# Patient Record
Sex: FEMALE | Race: WHITE | NOT HISPANIC OR LATINO | Employment: OTHER | URBAN - METROPOLITAN AREA
[De-identification: names, ages, dates, MRNs, and addresses within clinical notes are randomized per-mention and may not be internally consistent; named-entity substitution may affect disease eponyms.]

---

## 2019-04-29 ENCOUNTER — APPOINTMENT (OUTPATIENT)
Dept: RADIOLOGY | Facility: CLINIC | Age: 74
End: 2019-04-29
Payer: MEDICARE

## 2019-04-29 ENCOUNTER — OFFICE VISIT (OUTPATIENT)
Dept: URGENT CARE | Facility: CLINIC | Age: 74
End: 2019-04-29
Payer: MEDICARE

## 2019-04-29 VITALS
SYSTOLIC BLOOD PRESSURE: 130 MMHG | RESPIRATION RATE: 18 BRPM | DIASTOLIC BLOOD PRESSURE: 73 MMHG | HEART RATE: 79 BPM | OXYGEN SATURATION: 96 % | WEIGHT: 230 LBS | HEIGHT: 67 IN | BODY MASS INDEX: 36.1 KG/M2 | TEMPERATURE: 97.1 F

## 2019-04-29 DIAGNOSIS — M79.671 RIGHT FOOT PAIN: ICD-10-CM

## 2019-04-29 DIAGNOSIS — M79.671 RIGHT FOOT PAIN: Primary | ICD-10-CM

## 2019-04-29 PROCEDURE — 99213 OFFICE O/P EST LOW 20 MIN: CPT | Performed by: NURSE PRACTITIONER

## 2019-04-29 PROCEDURE — 73630 X-RAY EXAM OF FOOT: CPT

## 2019-04-29 RX ORDER — MIRABEGRON 50 MG/1
TABLET, FILM COATED, EXTENDED RELEASE ORAL
Refills: 0 | COMMUNITY
Start: 2019-03-18

## 2019-04-29 RX ORDER — LEVOTHYROXINE SODIUM 0.12 MG/1
125 TABLET ORAL DAILY
Refills: 0 | COMMUNITY
Start: 2019-03-18

## 2019-04-29 RX ORDER — LISINOPRIL AND HYDROCHLOROTHIAZIDE 20; 12.5 MG/1; MG/1
1 TABLET ORAL DAILY
Refills: 0 | COMMUNITY
Start: 2019-03-18

## 2019-04-29 RX ORDER — TRAMADOL HYDROCHLORIDE 50 MG/1
TABLET ORAL
Refills: 0 | COMMUNITY
Start: 2019-03-17

## 2019-04-29 RX ORDER — GABAPENTIN 300 MG/1
CAPSULE ORAL
Refills: 0 | COMMUNITY
Start: 2019-04-17

## 2019-04-29 RX ORDER — LORAZEPAM 0.5 MG/1
TABLET ORAL
Refills: 0 | COMMUNITY
Start: 2019-04-17

## 2019-04-29 RX ORDER — ATORVASTATIN CALCIUM 40 MG/1
TABLET, FILM COATED ORAL
Refills: 0 | COMMUNITY
Start: 2019-03-09

## 2019-04-29 RX ORDER — ESCITALOPRAM OXALATE 10 MG/1
10 TABLET ORAL DAILY
Refills: 0 | COMMUNITY
Start: 2019-04-15

## 2019-04-29 RX ORDER — HUMAN INSULIN 100 [USP'U]/ML
150 INJECTION, SUSPENSION SUBCUTANEOUS
Refills: 0 | COMMUNITY
Start: 2019-03-11

## 2019-04-29 RX ORDER — ESCITALOPRAM OXALATE 20 MG/1
20 TABLET ORAL DAILY
Refills: 0 | COMMUNITY
Start: 2019-04-08

## 2019-04-29 RX ORDER — PREDNISONE 20 MG/1
20 TABLET ORAL 2 TIMES DAILY WITH MEALS
Qty: 10 TABLET | Refills: 0 | Status: SHIPPED | OUTPATIENT
Start: 2019-04-29 | End: 2019-05-04

## 2019-04-29 RX ORDER — BUSPIRONE HYDROCHLORIDE 10 MG/1
TABLET ORAL
Refills: 0 | COMMUNITY
Start: 2019-03-22

## 2019-05-02 RX ORDER — PEN NEEDLE, DIABETIC 29 G X1/2"
NEEDLE, DISPOSABLE MISCELLANEOUS
Refills: 0 | COMMUNITY
Start: 2019-03-09

## 2019-05-02 RX ORDER — METOPROLOL SUCCINATE 25 MG/1
TABLET, EXTENDED RELEASE ORAL
COMMUNITY
Start: 2017-12-12

## 2019-05-02 RX ORDER — LANSOPRAZOLE 30 MG/1
30 CAPSULE, DELAYED RELEASE ORAL DAILY
COMMUNITY
Start: 2018-11-27

## 2019-05-07 ENCOUNTER — OFFICE VISIT (OUTPATIENT)
Dept: FAMILY MEDICINE CLINIC | Facility: CLINIC | Age: 74
End: 2019-05-07
Payer: MEDICARE

## 2019-05-07 VITALS
SYSTOLIC BLOOD PRESSURE: 140 MMHG | TEMPERATURE: 98 F | DIASTOLIC BLOOD PRESSURE: 60 MMHG | HEART RATE: 76 BPM | OXYGEN SATURATION: 96 % | RESPIRATION RATE: 20 BRPM | HEIGHT: 65 IN | WEIGHT: 231 LBS | BODY MASS INDEX: 38.49 KG/M2

## 2019-05-07 DIAGNOSIS — M10.9 ACUTE GOUT INVOLVING TOE OF RIGHT FOOT, UNSPECIFIED CAUSE: Primary | ICD-10-CM

## 2019-05-07 PROBLEM — K21.9 GASTROESOPHAGEAL REFLUX DISEASE WITHOUT ESOPHAGITIS: Status: ACTIVE | Noted: 2019-05-07

## 2019-05-07 PROBLEM — J44.9 COPD (CHRONIC OBSTRUCTIVE PULMONARY DISEASE) (HCC): Status: ACTIVE | Noted: 2019-05-07

## 2019-05-07 PROBLEM — Z79.4 TYPE 2 DIABETES MELLITUS WITHOUT COMPLICATION, WITH LONG-TERM CURRENT USE OF INSULIN (HCC): Status: ACTIVE | Noted: 2019-05-07

## 2019-05-07 PROBLEM — I10 ESSENTIAL HYPERTENSION: Status: ACTIVE | Noted: 2019-05-07

## 2019-05-07 PROBLEM — E78.2 MIXED HYPERLIPIDEMIA: Status: ACTIVE | Noted: 2019-05-07

## 2019-05-07 PROBLEM — F41.9 ANXIETY: Status: ACTIVE | Noted: 2019-05-07

## 2019-05-07 PROBLEM — E03.9 ACQUIRED HYPOTHYROIDISM: Status: ACTIVE | Noted: 2019-05-07

## 2019-05-07 PROBLEM — E11.9 TYPE 2 DIABETES MELLITUS WITHOUT COMPLICATION, WITH LONG-TERM CURRENT USE OF INSULIN (HCC): Status: ACTIVE | Noted: 2019-05-07

## 2019-05-07 PROCEDURE — 99203 OFFICE O/P NEW LOW 30 MIN: CPT | Performed by: NURSE PRACTITIONER

## 2020-01-06 ENCOUNTER — TELEPHONE (OUTPATIENT)
Dept: FAMILY MEDICINE CLINIC | Facility: CLINIC | Age: 75
End: 2020-01-06

## 2020-02-03 NOTE — TELEPHONE ENCOUNTER
02/03/20 2:52 PM     Thank you for your request  Your request has been received and reviewed  This message will now be completed  For future reference, please do not remove PCP at office level for this scenario; [facility] department will remove PCP as part of their workflow       Thank you  Ericka Aj

## 2021-02-25 ENCOUNTER — IMMUNIZATIONS (OUTPATIENT)
Dept: FAMILY MEDICINE CLINIC | Facility: HOSPITAL | Age: 76
End: 2021-02-25

## 2021-02-25 DIAGNOSIS — Z23 ENCOUNTER FOR IMMUNIZATION: Primary | ICD-10-CM

## 2021-02-25 PROCEDURE — 0011A SARS-COV-2 / COVID-19 MRNA VACCINE (MODERNA) 100 MCG: CPT

## 2021-02-25 PROCEDURE — 91301 SARS-COV-2 / COVID-19 MRNA VACCINE (MODERNA) 100 MCG: CPT

## 2021-03-25 ENCOUNTER — IMMUNIZATIONS (OUTPATIENT)
Dept: FAMILY MEDICINE CLINIC | Facility: HOSPITAL | Age: 76
End: 2021-03-25

## 2021-03-25 DIAGNOSIS — Z23 ENCOUNTER FOR IMMUNIZATION: Primary | ICD-10-CM

## 2021-03-25 PROCEDURE — 91301 SARS-COV-2 / COVID-19 MRNA VACCINE (MODERNA) 100 MCG: CPT

## 2021-03-25 PROCEDURE — 0012A SARS-COV-2 / COVID-19 MRNA VACCINE (MODERNA) 100 MCG: CPT

## 2022-01-05 ENCOUNTER — TELEPHONE (OUTPATIENT)
Dept: SPEECH THERAPY | Facility: CLINIC | Age: 77
End: 2022-01-05

## 2022-01-10 ENCOUNTER — CONSULT (OUTPATIENT)
Dept: PULMONOLOGY | Facility: MEDICAL CENTER | Age: 77
End: 2022-01-10
Payer: MEDICARE

## 2022-01-10 VITALS
HEART RATE: 95 BPM | OXYGEN SATURATION: 98 % | HEIGHT: 66 IN | DIASTOLIC BLOOD PRESSURE: 82 MMHG | RESPIRATION RATE: 12 BRPM | TEMPERATURE: 91 F | BODY MASS INDEX: 32.62 KG/M2 | WEIGHT: 203 LBS | SYSTOLIC BLOOD PRESSURE: 122 MMHG

## 2022-01-10 DIAGNOSIS — I10 ESSENTIAL HYPERTENSION: ICD-10-CM

## 2022-01-10 DIAGNOSIS — R91.1 LUNG NODULE: ICD-10-CM

## 2022-01-10 DIAGNOSIS — J43.2 CENTRILOBULAR EMPHYSEMA (HCC): Primary | ICD-10-CM

## 2022-01-10 PROCEDURE — 99204 OFFICE O/P NEW MOD 45 MIN: CPT | Performed by: INTERNAL MEDICINE

## 2022-01-10 RX ORDER — ALBUTEROL SULFATE 90 UG/1
2 AEROSOL, METERED RESPIRATORY (INHALATION) EVERY 6 HOURS PRN
Qty: 18 G | Refills: 3 | Status: SHIPPED | OUTPATIENT
Start: 2022-01-10

## 2022-01-10 RX ORDER — BUDESONIDE 0.5 MG/2ML
0.5 INHALANT ORAL 2 TIMES DAILY
COMMUNITY
Start: 2021-07-15

## 2022-01-10 RX ORDER — IPRATROPIUM BROMIDE AND ALBUTEROL SULFATE 2.5; .5 MG/3ML; MG/3ML
3 SOLUTION RESPIRATORY (INHALATION)
COMMUNITY
Start: 2021-07-27

## 2022-01-10 NOTE — ASSESSMENT & PLAN NOTE
PET/Ct scan in 8/7/2021 shows 1 cm RUL nodule with no size increase compared to May since 2021 and very little if any change comparing with August 2021   Minimal FGD uptake     - follow up with CT chest wo contrast

## 2022-01-10 NOTE — ASSESSMENT & PLAN NOTE
COPD/ emphyzema  Patient gets SOB with walking 1/4 of a block  Patient is a former smoker for 20 years less then 1 pack per day  She used to work in the [employer] department for 25 years    Patient had PFT done in the past but result is not available  Patient has no allergies to dust or polline    Pulmoicort and DuoNebs via nebulizer twice a day    - continue nebulizers budesonide and duonebs bid  - albuterol prn  - follow PFT

## 2022-01-10 NOTE — PROGRESS NOTES
Assessment/Plan:    Type 2 diabetes mellitus without complication, with long-term current use of insulin ([facility] )  Patient is on metformin, Novolin     No results found for: HGBA1C    Acquired hypothyroidism  Patient takes levothyroxine  Follows with family medicine doctor    Anxiety  Patient takes ativan, buspiron and escitalopram  Patient reports was hospitalized in June for anxiety and SOB    COPD (chronic obstructive pulmonary disease) ([facility] )  COPD/ emphyzema  Patient gets SOB with walking 1/4 of a block  Patient is a former smoker for 20 years less then 1 pack per day  She used to work in the police department for 25 years  Patient had PFT done in the past but result is not available  Patient has no allergies to dust or polline    Pulmoicort and DuoNebs via nebulizer twice a day    - continue nebulizers  bid  - albuterol prn      Pulmonary nodule  PET/Ct scan in 8/7/2021 shows 1 cm RUL nodule with no size increase compared to May since 2021 and very little if any change comparing with August 2021  Minimal FGD uptake     - follow up with CT chest wo contrast       Diagnoses and all orders for this visit:    Centrilobular emphysema (HCC)  -     albuterol (Ventolin HFA) 90 mcg/act inhaler; Inhale 2 puffs every 6 (six) hours as needed for wheezing    Essential hypertension    Lung nodule  -     CT chest without contrast; Future    Other orders  -     budesonide (PULMICORT) 0 5 mg/2 mL nebulizer solution; Inhale 0 5 mg 2 (two) times a day  -     ipratropium-albuterol (DUO-NEB) 0 5-2 5 mg/3 mL nebulizer solution; Inhale 3 mL          Subjective:      Patient ID: Kya Bell is a 67 y o  female  HPI  Patient comes to establish care with pulmonologist  She used to see Dr Nimo Jeronimo but now he is retired  Patient is complaining of sputum production and thick mucous that sits in throat  Patient relates this to using nebulizer that she started 2 years ago   Patient has an appointment with speech pathologist   Patient reports significant SOB related to exertion  Sh eonly can walk 1/4 of a block and then needs to stop and catch her breath  Patient denies cough, chest pain, wheezing, leg swelling or palpitations  She  denies hx of heart issues  Patient had PFT done in the past but records are not available  Patient is compliant with nebulizer ipreatropium and budesonide that seem to work  Patient feels she needs rescue inhaler  Patient also complains of weakness in her right arm and pain in her right shoulder restricting full ROM  Patient was seen by neurologist for that  Patient PCP is Dr Rajwinder Madera  Patient is vaccinated for COVID  Review of Systems   Constitutional: Negative  Negative for chills, fatigue and fever  HENT: Positive for congestion, postnasal drip and voice change  Negative for hearing loss, rhinorrhea, sore throat, tinnitus and trouble swallowing  Feeling of mucous intehthroat   Respiratory: Positive for shortness of breath  Negative for cough, chest tightness and wheezing  Cardiovascular: Negative for chest pain, palpitations and leg swelling  Gastrointestinal: Negative for blood in stool and constipation  Skin: Negative for pallor and rash  Allergic/Immunologic: Negative  Neurological: Negative for weakness and headaches  Hematological: Negative  Psychiatric/Behavioral: Negative for suicidal ideas  The patient is not nervous/anxious  Objective:      /82   Pulse 95   Temp (!) 91 °F (32 8 °C) (Temporal)   Resp 12   Ht 5' 6" (1 676 m)   Wt 92 1 kg (203 lb)   SpO2 98%   BMI 32 77 kg/m²          Physical Exam  Vitals reviewed  Constitutional:       General: She is not in acute distress  Appearance: Normal appearance  She is obese  She is ill-appearing  She is not toxic-appearing  HENT:      Head: Normocephalic and atraumatic  Nose: Nose normal       Mouth/Throat:      Mouth: Mucous membranes are moist    Eyes:      General: No scleral icterus       Extraocular Movements: Extraocular movements intact  Conjunctiva/sclera: Conjunctivae normal    Cardiovascular:      Rate and Rhythm: Normal rate and regular rhythm  Pulses: Normal pulses  Heart sounds: Normal heart sounds  No murmur heard  No gallop  Pulmonary:      Effort: Pulmonary effort is normal  No respiratory distress  Breath sounds: Normal breath sounds  No wheezing, rhonchi or rales  Abdominal:      General: Bowel sounds are normal  There is no distension  Tenderness: There is no abdominal tenderness  There is no guarding or rebound  Musculoskeletal:         General: No swelling or tenderness  Skin:     General: Skin is warm  Coloration: Skin is not jaundiced  Findings: No erythema or rash  Neurological:      General: No focal deficit present  Mental Status: She is alert and oriented to person, place, and time  Cranial Nerves: No cranial nerve deficit  Motor: No weakness     Psychiatric:         Mood and Affect: Mood normal          Behavior: Behavior normal

## 2022-01-10 NOTE — ASSESSMENT & PLAN NOTE
Patient takes ativan, buspiron and escitalopram  Patient reports was hospitalized in June for anxiety and SOB

## 2022-01-19 ENCOUNTER — EVALUATION (OUTPATIENT)
Dept: SPEECH THERAPY | Facility: CLINIC | Age: 77
End: 2022-01-19
Payer: MEDICARE

## 2022-01-19 DIAGNOSIS — R48.8 OTHER SYMBOLIC DYSFUNCTIONS: ICD-10-CM

## 2022-01-19 DIAGNOSIS — R47.1 DYSARTHRIA: Primary | ICD-10-CM

## 2022-01-19 PROCEDURE — 96125 COGNITIVE TEST BY HC PRO: CPT | Performed by: SPEECH-LANGUAGE PATHOLOGIST

## 2022-01-19 PROCEDURE — 92522 EVALUATE SPEECH PRODUCTION: CPT | Performed by: SPEECH-LANGUAGE PATHOLOGIST

## 2022-01-19 NOTE — PROGRESS NOTES
Speech Language Pathology Evaluation  Today's date: 2022  Patient name: Armani Pablo    : 1945        Referring provider: Yovani Cortez MD  Dx:   Encounter Diagnosis     ICD-10-CM    1  Dysarthria  R47 1    2  Other symbolic dysfunctions  X12 4                   Subjective Comments: The patient is a 67year old female seen for a speech and cognitive linguistic exam  Etiology is unclear but the patient notes that she experienced right sided weakness approximately 3 months ago  Speech intelligibility declined at that time as the patient noted difficulty producing multisyllabic words  She also noted intermittent drooling, right sided, during meals and when at rest  She also expressed some difficulty finding words when pressed to verbally express complete thoughts and ideas  Patient's goal is to improve ineligibility of speech  Pain level: 00/10    Safety Measures: Patient with history of recent falls  Reason for Referral:Change in vocal quality and Decreased language skills  Prior Functional Status:Communication effective and appropriate in all situations  Medical History significant for:   Past Medical History:   Diagnosis Date    Anxiety     COPD [facility]chronic obstructive [facility] disease) [facility] )     Depression     Diabetes mellitus ([facility] )     Disease of thyroid gland     Hyperlipidemia     Hypertension      Clinically Complex Situations:Etiology unknown  Hearing:Not Tested  Vision:Other patient wears glasses for near vision tasks   Patient did not have eyeglasses for today's exam    Medication List:   Current Outpatient Medications   Medication Sig Dispense Refill    albuterol (Ventolin HFA) 90 mcg/act inhaler Inhale 2 puffs every 6 (six) hours as needed for wheezing 18 g 3    atorvastatin (LIPITOR) 40 mg tablet   0    BD INSULIN SYRINGE U/F 31G X 5/16" 1 ML MISC   0    budesonide (PULMICORT) 0 5 mg/2 mL nebulizer solution Inhale 0 5 mg 2 (two) times a day      busPIRone (BUSPAR) 10 mg tablet   0    escitalopram (LEXAPRO) 10 mg tablet Take 10 mg by mouth daily  0    escitalopram (LEXAPRO) 20 mg tablet Take 20 mg by mouth daily  0    gabapentin (NEURONTIN) 300 mg capsule   0    ipratropium-albuterol (DUO-NEB) 0 5-2 5 mg/3 mL nebulizer solution Inhale 3 mL      lansoprazole (PREVACID) 30 mg capsule Take 30 mg by mouth daily      levothyroxine 125 mcg tablet Take 125 mcg by mouth daily  0    lisinopril-hydrochlorothiazide (PRINZIDE,ZESTORETIC) 20-12 5 MG per tablet Take 1 tablet by mouth daily  0    LORazepam (ATIVAN) 0 5 mg tablet   0    metFORMIN (GLUCOPHAGE) 500 mg tablet Take 500 mg by mouth 2 (two) times a day  0    metoprolol succinate (TOPROL-XL) 25 mg 24 hr tablet take 1 tablet by mouth once daily      MYRBETRIQ 50 MG TB24   0    NOVOLIN 70/30 (70-30) 100 UNIT/ML subcutaneous injection Inject 150 Units under the skin 2 (two) times a day before meals   0    traMADol (ULTRAM) 50 mg tablet   0     No current facility-administered medications for this visit  Allergies: Allergies   Allergen Reactions    Dilaudid [Hydromorphone] Hallucinations    Hydrocortisone Rash     Other reaction(s):  itchy    Neosporin Af [Miconazole] Rash    Pentazocine Tremor     Reaction Date: [id number]; Annotation - [id number]: Anxiety  / lb     Primary Language: English  Preferred Language: [address] [place] [address] at home with adult son  Current Education status: Not in school   Highest Level of Education: [employer]     Current / Prior Services being received: None    Mental Status: Alert  Behavior Status:Cooperative  Communication Modalities: Verbal  Recent Speech/ Language therapy:None  Rehabilitation Prognosis:Good rehab potential to reach the established goals    Assessments:  Oral Motor Assessment     Facial Appearance:Right facial droop  Structural Abnormality:No  Tracheostomy Present:No  Dentition:Edentulous  Oral Hygiene:Adequate  Oral Motor Strength Limitations:Lips  Right and Tongue  Right  Range of Motion Limitations:Lips  Right and Tongue  Right  Rate of Movement Reduced for: Tongue    Coordination Limitation:  Sensation:      Sustained phonation reveals:Poor vocal quality  Vocal Cord Adduction appears weak on:Throat clear  Respiration:Shallow  Is Drooling Present:No- however, patient reports drooling at home  Speech Intelligibility - Speech intelligibility was fair with difficulty noted for production of multisyllabic words due to reduced articulatory strength,  range of motion and rate of motion, especially on the right side  Speech pattern generally consistent with flaccid dysarthria  Rate of speech was slow  Perceptually, speech appeared labored  Diadochokinetic Testing Reveals:  Sequential Motion Rates:Irregular   Alternate Motion Rates:Irregular             Cognitive Assessment  Highest Level of Education:High School/GED   The Cognitive Linguistic Quick Test (CLQT) is designed to measure an individuals five cognitive domains (attention, memory, executive functions, language, and visuospatial skills)  This norm-referenced tool has been standardized on adults ages 25 through 80years old with neurological impairment as a result of CVA, TBI, or dementia  The following results were obtained during the administration of the assessment  Cognitive Domain: Score: Range of Severity:   Attention 128/215 Mild   Memory 130/185 Mild   Executive Functions 9/40 Moderate   Language  27/37 Mild   Visuospatial Skills  46/105 Mild        *Composite Severity Ratin 8/4 0  Mild             Clock Drawin/13 WNL     Patient scored below [place] Scores on the following subtests: Symbol Trails, Mazes and Design Generation  *Patient named 16 concrete category members (animals) in 60 sec (norm=15+)  -- AVERAGE    *Patient named 2 abstract category members (words beginning with letter 'm') in 60 sec (norm=10+)   -- BELOW AVERAGE    Analysis  Language skills were mildly impaired as the patient had difficulty accessing abstract category items and retelling narratives  Narrative recall was characterized fair recall of the overall narrative structure but poor recall of details  Attention was mildly impaired for both verbal and visual tasks  The patient displayed significant difficulty with visually based tasks  This may be related to the absence of her reading glasses during testing but is more likely due to underlying cognitive impairment  The patient's poor executive function score reflects her difficulty in the following visually based subsets - symbol trails, mazes and design generation  Executive function relates to a set of mental processes that enable planning, focus, attention and memory in order to complete tasks  Working memory and mental flexibility are two key components of executive function  Given the her poor performance in these areas along with complaints of right hand dexterity, occupational therapy consult is advised  Goals  Short Term Goals:  Goal 1: Patient will complete oral motor exercises with 90% effectiveness  Goal 2: Patient will use over articulation technique at word through phrase level with 90% accuracy/intelligibility  Goal 3: Patient will use over articulation technique combined louder voice with 90% accuracy/intelligibility  Goal 4: Patient will complete mid to high level word finding tasks with 90% accuracy  Goal 5: Patient will retell narratives with 80% accuracy  Goal 6: Improve attention for structured tasks to 90% accuracy  Long Term Goals:  1  Improve intelligibility of speech   2  Improve cognitive linguistic and  language skills          Impressions/ Recommendations  Impressions:  Patient presents with mild flaccid dysarthria and mild language based cognitive deficits   Speech intelligibility was fair with difficulty noted for production of multisyllabic words due to reduced articulatory strength and range of motion ,  rate of motion, especially on the right side  Speech pattern generally consistent with flaccid dysarthria  Rate of speech was slow  Perceptually, speech appeared labored  Language skills were mildly impaired as the patient had difficulty accessing abstract category items and retelling narratives  Narrative recall was characterized fair recall of the overall narrative structure but poor recall of details  Attention was mildly impaired for both verbal and visual tasks  The patient displayed significant difficulty with visually based tasks  This may be related to the absence of her reading glasses during testing but is more likely due to underlying cognitive impairment  The patient's poor executive function score reflects her difficulty in the following visually based subsets - symbol trails, mazes and design generation  Executive function relates to a set of mental processes that enable planning, focus, attention and memory in order to complete tasks  Working memory and mental flexibility are two key components of executive function  Given the her poor performance in these areas along with complaints of right hand dexterity, occupational therapy consult is advised  Recommendations:   Patients would benefit from: Speech/ language therapy        Suggest occupational therapy and physical therapy evaluations    Frequency:1 x weekly   Duration:Other 3 months     Intervention certification [id number]:7/21/5101  Intervention certification YW:1/46/4375  Intervention Comments:Patient participated well with today's assessment

## 2022-01-19 NOTE — LETTER
2022    Estefania Pérez MD  [address]    Patient: Anupama Chaudhari   YOB: 1945   Date of Visit: 2022     Encounter Diagnosis     ICD-10-CM    1  Dysarthria  R47 1    2  Other symbolic dysfunctions  E27 3        Dear Dr Kasie Sandhu: Thank you for your recent referral of Anupama Chaudhari  Please review the attached evaluation summary from [facility]'s recent visit  Please verify that you agree with the plan of care by signing the attached order  If you have any questions or concerns, please do not hesitate to call  I sincerely appreciate the opportunity to share in the care of one of your patients and hope to have another opportunity to work with you in the near future  Sincerely,    Ashley Josue CCC-SLP      Referring Provider:     Based upon review of the patient's progress and continued therapy plan, it is my medical opinion that Anupama Chaudhari should continue speech therapy treatment at the [facility] Therapy at [address] [place] [address]:                    Estefania Pérez MD  [address]  Via Fax: [phone]                  Speech Language Pathology Evaluation  Today's date: 2022  Patient name: Anupama Chaudhari    : 1945        Referring provider: Siria Larios MD  Dx:   Encounter Diagnosis     ICD-10-CM    1  Dysarthria  R47 1    2  Other symbolic dysfunctions  B14 8                   Subjective Comments: The patient is a 67year old female seen for a speech and cognitive linguistic exam  Etiology is unclear but the patient notes that she experienced right sided weakness approximately 3 months ago  Speech intelligibility declined at that time as the patient noted difficulty producing multisyllabic words  She also noted intermittent drooling, right sided, during meals and when at rest  She also expressed some difficulty finding words when pressed to verbally express complete thoughts and ideas   Patient's goal is to improve ineligibility of speech  Pain level: 00/10    Safety Measures: Patient with history of recent falls  Reason for Referral:Change in vocal quality and Decreased language skills  Prior Functional Status:Communication effective and appropriate in all situations  Medical History significant for:   Past Medical History:   Diagnosis Date    Anxiety     [facility]     Depression     Diabetes mellitus ([facility] )     Disease of thyroid gland     Hyperlipidemia     Hypertension      Clinically Complex Situations:Etiology unknown  Hearing:Not Tested  Vision:Other patient wears glasses for near vision tasks   Patient did not have eyeglasses for today's exam    Medication List:   Current Outpatient Medications   Medication Sig Dispense Refill    albuterol (Ventolin HFA) 90 mcg/act inhaler Inhale 2 puffs every 6 (six) hours as needed for wheezing 18 g 3    atorvastatin (LIPITOR) 40 mg tablet   0    BD INSULIN SYRINGE U/F 31G X 5/16" 1 ML MISC   0    budesonide (PULMICORT) 0 5 mg/2 mL nebulizer solution Inhale 0 5 mg 2 (two) times a day      busPIRone (BUSPAR) 10 mg tablet   0    escitalopram (LEXAPRO) 10 mg tablet Take 10 mg by mouth daily  0    escitalopram (LEXAPRO) 20 mg tablet Take 20 mg by mouth daily  0    gabapentin (NEURONTIN) 300 mg capsule   0    ipratropium-albuterol (DUO-NEB) 0 5-2 5 mg/3 mL nebulizer solution Inhale 3 mL      lansoprazole (PREVACID) 30 mg capsule Take 30 mg by mouth daily      levothyroxine 125 mcg tablet Take 125 mcg by mouth daily  0    lisinopril-hydrochlorothiazide (PRINZIDE,ZESTORETIC) 20-12 5 MG per tablet Take 1 tablet by mouth daily  0    LORazepam (ATIVAN) 0 5 mg tablet   0    metFORMIN (GLUCOPHAGE) 500 mg tablet Take 500 mg by mouth 2 (two) times a day  0    metoprolol succinate (TOPROL-XL) 25 mg 24 hr tablet take 1 tablet by mouth once daily      MYRBETRIQ 50 MG TB24   0    NOVOLIN 70/30 (70-30) 100 UNIT/ML subcutaneous injection Inject 150 Units under the skin 2 (two) times a day before meals   0    traMADol (ULTRAM) 50 mg tablet   0     No current facility-administered medications for this visit  Allergies: Allergies   Allergen Reactions    Dilaudid [Hydromorphone] Hallucinations    Hydrocortisone Rash     Other reaction(s):  itchy    Neosporin Af [Miconazole] Rash    Pentazocine Tremor     Reaction Date: [id number]; Annotation - [id number]: Anxiety  / lb     Primary Language: English  Preferred Language: [address] Shanda Solis at home with adult son  Current Education status: Not in school   Highest Level of Education: [employer]  Current / Prior Services being received: None    Mental Status: Alert  Behavior Status:Cooperative  Communication Modalities: Verbal  Recent Speech/ Language therapy:None  Rehabilitation Prognosis:Good rehab potential to reach the established goals    Assessments:  Oral Motor Assessment     Facial Appearance:Right facial droop  Structural Abnormality:No  Tracheostomy Present:No  Dentition:Edentulous  Oral Hygiene:Adequate  Oral Motor Strength Limitations:Lips  Right and Tongue  Right  Range of Motion Limitations:Lips  Right and Tongue  Right  Rate of Movement Reduced for: Tongue    Coordination Limitation:  Sensation:      Sustained phonation reveals:Poor vocal quality  Vocal Cord Adduction appears weak on:Throat clear  Respiration:Shallow  Is Drooling Present:No- however, patient reports drooling at home  Speech Intelligibility - Speech intelligibility was fair with difficulty noted for production of multisyllabic words due to reduced articulatory strength,  range of motion and rate of motion, especially on the right side  Speech pattern generally consistent with flaccid dysarthria  Rate of speech was slow  Perceptually, speech appeared labored       Diadochokinetic Testing Reveals:  Sequential Motion Rates:Irregular   Alternate Motion Rates:Irregular             Cognitive Assessment  Highest Level of Education:High School/GED   The Cognitive Linguistic Quick Test (CLQT) is designed to measure an individuals five cognitive domains (attention, memory, executive functions, language, and visuospatial skills)  This norm-referenced tool has been standardized on adults ages 25 through 80years old with neurological impairment as a result of CVA, TBI, or dementia  The following results were obtained during the administration of the assessment  Cognitive Domain: Score: Range of Severity:   Attention 128/215 Mild   Memory 130/185 Mild   Executive Functions 9/40 Moderate   Language  27/37 Mild   Visuospatial Skills  46/105 Mild        *Composite Severity Ratin 8/4 0  Mild             Clock Drawin/13 WNL     Patient scored below [place] Cut Scores on the following subtests: Symbol Trails, Mazes and Design Generation  *Patient named 16 concrete category members (animals) in 60 sec (norm=15+)  -- AVERAGE    *Patient named 2 abstract category members (words beginning with letter 'm') in 60 sec (norm=10+)  -- BELOW AVERAGE    Analysis  Language skills were mildly impaired as the patient had difficulty accessing abstract category items and retelling narratives  Narrative recall was characterized fair recall of the overall narrative structure but poor recall of details  Attention was mildly impaired for both verbal and visual tasks  The patient displayed significant difficulty with visually based tasks  This may be related to the absence of her reading glasses during testing but is more likely due to underlying cognitive impairment  The patient's poor executive function score reflects her difficulty in the following visually based subsets - symbol trails, mazes and design generation  Executive function relates to a set of mental processes that enable planning, focus, attention and memory in order to complete tasks   Working memory and mental flexibility are two key components of executive function  Given the her poor performance in these areas along with complaints of right hand dexterity, occupational therapy consult is advised  Goals  Short Term Goals:  Goal 1: Patient will complete oral motor exercises with 90% effectiveness  Goal 2: Patient will use over articulation technique at word through phrase level with 90% accuracy/intelligibility  Goal 3: Patient will use over articulation technique combined louder voice with 90% accuracy/intelligibility  Goal 4: Patient will complete mid to high level word finding tasks with 90% accuracy  Goal 5: Patient will retell narratives with 80% accuracy  Goal 6: Improve attention for structured tasks to 90% accuracy  Long Term Goals:  1  Improve intelligibility of speech   2  Improve cognitive linguistic and  language skills          Impressions/ Recommendations  Impressions:  Patient presents with mild flaccid dysarthria and mild language based cognitive deficits  Speech intelligibility was fair with difficulty noted for production of multisyllabic words due to reduced articulatory strength and range of motion ,  rate of motion, especially on the right side  Speech pattern generally consistent with flaccid dysarthria  Rate of speech was slow  Perceptually, speech appeared labored  Language skills were mildly impaired as the patient had difficulty accessing abstract category items and retelling narratives  Narrative recall was characterized fair recall of the overall narrative structure but poor recall of details  Attention was mildly impaired for both verbal and visual tasks  The patient displayed significant difficulty with visually based tasks  This may be related to the absence of her reading glasses during testing but is more likely due to underlying cognitive impairment   The patient's poor executive function score reflects her difficulty in the following visually based subsets - symbol trails, mazes and design generation  Executive function relates to a set of mental processes that enable planning, focus, attention and memory in order to complete tasks  Working memory and mental flexibility are two key components of executive function  Given the her poor performance in these areas along with complaints of right hand dexterity, occupational therapy consult is advised  Recommendations:   Patients would benefit from: Speech/ language therapy        Suggest occupational therapy and physical therapy evaluations    Frequency:1 x weekly   Duration:Other 3 months     Intervention certification [place]:9/44/1586  Intervention certification [place]:6/75/3791  Intervention Comments:Patient participated well with today's assessment

## 2022-01-21 ENCOUNTER — TELEPHONE (OUTPATIENT)
Dept: PULMONOLOGY | Facility: MEDICAL CENTER | Age: 77
End: 2022-01-21

## 2022-01-21 NOTE — TELEPHONE ENCOUNTER
LM for patient to call back to schedule 2m f/u in March  with Dr Amelia Buchanan in the [employer] office  Appointment reminder mailed

## 2022-01-24 ENCOUNTER — APPOINTMENT (OUTPATIENT)
Dept: LAB | Facility: CLINIC | Age: 77
End: 2022-01-24
Payer: MEDICARE

## 2022-01-24 DIAGNOSIS — I10 ESSENTIAL HYPERTENSION, MALIGNANT: ICD-10-CM

## 2022-01-24 DIAGNOSIS — E03.4 ATROPHIC THYROIDITIS: ICD-10-CM

## 2022-01-24 DIAGNOSIS — E78.00 PURE HYPERCHOLESTEROLEMIA: ICD-10-CM

## 2022-01-24 DIAGNOSIS — E55.9 VITAMIN D DEFICIENCY DISEASE: ICD-10-CM

## 2022-01-24 DIAGNOSIS — E11.9 TYPE 2 DIABETES MELLITUS WITHOUT COMPLICATION, WITHOUT LONG-TERM CURRENT USE OF INSULIN (HCC): ICD-10-CM

## 2022-01-24 DIAGNOSIS — R79.89 HYPOURICEMIA: ICD-10-CM

## 2022-01-24 LAB
25(OH)D3 SERPL-MCNC: 33.9 NG/ML (ref 30–100)
ALBUMIN SERPL BCP-MCNC: 3.5 G/DL (ref 3.5–5)
ALP SERPL-CCNC: 86 U/L (ref 46–116)
ALT SERPL W P-5'-P-CCNC: 23 U/L (ref 12–78)
ANION GAP SERPL CALCULATED.3IONS-SCNC: 4 MMOL/L (ref 4–13)
AST SERPL W P-5'-P-CCNC: 16 U/L (ref 5–45)
BILIRUB SERPL-MCNC: 0.66 MG/DL (ref 0.2–1)
BUN SERPL-MCNC: 16 MG/DL (ref 5–25)
CALCIUM SERPL-MCNC: 11.1 MG/DL (ref 8.3–10.1)
CHLORIDE SERPL-SCNC: 105 MMOL/L (ref 100–108)
CHOLEST SERPL-MCNC: 175 MG/DL
CO2 SERPL-SCNC: 30 MMOL/L (ref 21–32)
CREAT SERPL-MCNC: 1.07 MG/DL (ref 0.6–1.3)
ERYTHROCYTE [DISTWIDTH] IN BLOOD BY AUTOMATED COUNT: 13.4 % (ref 11.6–15.1)
EST. AVERAGE GLUCOSE BLD GHB EST-MCNC: 143 MG/DL
GFR SERPL CREATININE-BSD FRML MDRD: 50 ML/MIN/1.73SQ M
GLUCOSE P FAST SERPL-MCNC: 147 MG/DL (ref 65–99)
HBA1C MFR BLD: 6.6 %
HCT VFR BLD AUTO: 43.4 % (ref 34.8–46.1)
HDLC SERPL-MCNC: 40 MG/DL
HGB BLD-MCNC: 13.9 G/DL (ref 11.5–15.4)
LDLC SERPL CALC-MCNC: 93 MG/DL (ref 0–100)
MCH RBC QN AUTO: 28.9 PG (ref 26.8–34.3)
MCHC RBC AUTO-ENTMCNC: 32 G/DL (ref 31.4–37.4)
MCV RBC AUTO: 90 FL (ref 82–98)
NONHDLC SERPL-MCNC: 135 MG/DL
PLATELET # BLD AUTO: 357 THOUSANDS/UL (ref 149–390)
PMV BLD AUTO: 10 FL (ref 8.9–12.7)
POTASSIUM SERPL-SCNC: 3.8 MMOL/L (ref 3.5–5.3)
PROT SERPL-MCNC: 7.7 G/DL (ref 6.4–8.2)
RBC # BLD AUTO: 4.81 MILLION/UL (ref 3.81–5.12)
SODIUM SERPL-SCNC: 139 MMOL/L (ref 136–145)
TRIGL SERPL-MCNC: 210 MG/DL
TSH SERPL DL<=0.05 MIU/L-ACNC: 0.41 UIU/ML (ref 0.36–3.74)
WBC # BLD AUTO: 7.65 THOUSAND/UL (ref 4.31–10.16)

## 2022-01-24 PROCEDURE — 82306 VITAMIN D 25 HYDROXY: CPT

## 2022-01-24 PROCEDURE — 85027 COMPLETE CBC AUTOMATED: CPT

## 2022-01-24 PROCEDURE — 83036 HEMOGLOBIN GLYCOSYLATED A1C: CPT

## 2022-01-24 PROCEDURE — 80061 LIPID PANEL: CPT

## 2022-01-24 PROCEDURE — 80053 COMPREHEN METABOLIC PANEL: CPT

## 2022-01-24 PROCEDURE — 36415 COLL VENOUS BLD VENIPUNCTURE: CPT

## 2022-01-24 PROCEDURE — 84443 ASSAY THYROID STIM HORMONE: CPT

## 2022-03-03 ENCOUNTER — OFFICE VISIT (OUTPATIENT)
Dept: SPEECH THERAPY | Facility: CLINIC | Age: 77
End: 2022-03-03
Payer: MEDICARE

## 2022-03-03 DIAGNOSIS — R47.1 DYSARTHRIA: Primary | ICD-10-CM

## 2022-03-03 DIAGNOSIS — R48.8 OTHER SYMBOLIC DYSFUNCTIONS: ICD-10-CM

## 2022-03-03 PROCEDURE — 92507 TX SP LANG VOICE COMM INDIV: CPT | Performed by: SPEECH-LANGUAGE PATHOLOGIST

## 2022-03-03 NOTE — PROGRESS NOTES
Speech Treatment Note/Progress Note     Today's date: 3/3/2022  Patient name: Caity Shepherd  : 1945  MRN: [id number]  Referring provider: Candi Herrera MD  Dx:   Encounter Diagnosis     ICD-10-CM    1  Dysarthria  R47 1    2  Other symbolic dysfunctions  K05 5                   Visit Number:2  Plan of Care good until: 2022  Next Progress Note: 4/3/2022    Subjective/Behavioral: Patient seen for first therapy session since initial evaluation in January  Patient was scheduled for several sessions but cancelled most sessions with the   Goal 1: Patient will complete oral motor exercises with 90% effectiveness  Passive range of motion exercises for labial and buccal muscle were introduced and practiced  Patient had difficulty with both exercises and the clinician was required to perform both exercises on the patient  Effectiveness was fair given patients chronic neck flexion  Goal 2: Patient will use over articulation technique at word through phrase level with 90% accuracy/intelligibility  Over articulation was introduced and practiced within the context of automatic sequences  She was able to use this technique with 70% effectiveness  Intelligibility increased during this structured task  Goal 3: Patient will use over articulation technique combined louder voice with 90% accuracy/intelligibility  Not addressed this session  Goal 4: Patient will complete mid to high level word finding tasks with 90% accuracy  Not addressed this session  Goal 5: Patient will retell narratives with 80% accuracy  Not addressed  this session  Goal 6: Improve attention for structured tasks to 90% accuracy  Not addressed this session  Progress   Patient has not been seen since her initial evaluation due to repeated cancellations  Consequently, there is not progress to report  However, see above for most recent accuracy levels       Other:Reviewed testing and plan of care with patient  Patient is in agreement with POC at this time       Recommendations:Continue with Plan of Care

## 2022-03-10 ENCOUNTER — OFFICE VISIT (OUTPATIENT)
Dept: SPEECH THERAPY | Facility: CLINIC | Age: 77
End: 2022-03-10
Payer: MEDICARE

## 2022-03-10 DIAGNOSIS — R48.8 OTHER SYMBOLIC DYSFUNCTIONS: ICD-10-CM

## 2022-03-10 DIAGNOSIS — R47.1 DYSARTHRIA: Primary | ICD-10-CM

## 2022-03-10 PROCEDURE — 92507 TX SP LANG VOICE COMM INDIV: CPT | Performed by: SPEECH-LANGUAGE PATHOLOGIST

## 2022-03-10 NOTE — PROGRESS NOTES
Speech Treatment Note/Progress Note     Today's date: 3/10/2022  Patient name: Shiraz Adam  : 1945  MRN: [id number]  Referring provider: Lorri Licona MD  Dx:   Encounter Diagnosis     ICD-10-CM    1  Dysarthria  R47 1    2  Other symbolic dysfunctions  P53 4        Start Time: 1330  Stop Time: 6739  Total time in clinic (min): 34 minutes    Visit Number:2  Plan of Care good until: 2022  Next Progress Note: 4/3/2022    Subjective/Behavioral: Patient seen for first therapy session since initial evaluation in January  Patient was scheduled for several sessions but cancelled most sessions with the   Goal 1: Patient will complete oral motor exercises with 90% effectiveness  Passive range of motion exercises for labial and buccal muscle were practiced  Patient had difficulty with both exercises and the clinician was required to perform both exercises on the patient  Effectiveness was fair given patients chronic neck flexion  Goal 2: Patient will use over articulation technique at word through phrase level with 90% accuracy/intelligibility  Over articulation was introduced and practiced with level one bilabial words and phrases  She was able to use this technique with 50% accuracy  Patient needed maximum modeling to reach this accuracy level  Intelligibility increased during this structured task  Goal 3: Patient will use over articulation technique combined louder voice with 90% accuracy/intelligibility  Not addressed this session  Goal 4: Patient will complete mid to high level word finding tasks with 90% accuracy  Word deduction was completed with 50% accuracy  Groups of 3 words were presented and Kaylijun Meade was asked to identify the word in which was described  Patient benefited from repetition of the word list as well as semantic expansion  Goal 5: Patient will retell narratives with 80% accuracy  Not addressed  this session       Goal 6: Improve attention for structured tasks to 90% accuracy  Not addressed this session  Progress   Patient has not been seen since her initial evaluation due to repeated cancellations  Consequently, there is not progress to report  However, see above for most recent accuracy levels  Other:Reviewed testing and plan of care with patient  Patient is in agreement with POC at this time       Recommendations:Continue with Plan of Care

## 2022-03-17 ENCOUNTER — OFFICE VISIT (OUTPATIENT)
Dept: SPEECH THERAPY | Facility: CLINIC | Age: 77
End: 2022-03-17
Payer: MEDICARE

## 2022-03-17 DIAGNOSIS — R47.1 DYSARTHRIA: Primary | ICD-10-CM

## 2022-03-17 DIAGNOSIS — R48.8 OTHER SYMBOLIC DYSFUNCTIONS: ICD-10-CM

## 2022-03-17 PROCEDURE — 92507 TX SP LANG VOICE COMM INDIV: CPT | Performed by: SPEECH-LANGUAGE PATHOLOGIST

## 2022-03-17 NOTE — PROGRESS NOTES
Speech Treatment Note        Insurance:  AMA/CMS Progress Note  POC expires Brandt Ashley #/ Referral # Total   Visits  Start date  Expiration date Extension  Visit limitation? PT only or  PT+OT? Co-Insurance   CMS 4/3/22 4/19/22  [facility]  Yes                                                                          Today's date: 3/17/2022  Patient name: Alexandra Rey  : 1945  MRN: [id number]  Referring provider: Peter Kent MD  Dx:   No diagnosis found  Visit Number:4  Plan of Care good until: 2022  Next Progress Note: 4/3/2022    Subjective/Behavioral: Patient seen for speech therapy  Patient reported swallowing difficulties so was counciled regarding strategies and techniques to ensure safe swallow is maintained  Will monitor swallowing and make additional recommendations as needed  Goal 1: Patient will complete oral motor exercises with 90% effectiveness  Passive range of motion exercises for labial and buccal muscle were practiced  Patient had greater success independently preforming the exercises as compared to last session  The clinician performed both exercises on the patient to assist in teaching as well as ensure integrity of the exercises  Effectiveness was fair given patients chronic neck flexion  Goal 2: Patient will use over articulation technique at word through phrase level with 90% accuracy/intelligibility  Over articulation was reviewed  and practiced with level one bilabial words and phrases  She was able to use this technique with 90% accuracy  Patient needed maximum modeling to reach this accuracy level  Intelligibility increased during this structured task  Goal 3: Patient will use over articulation technique combined louder voice with 90% accuracy/intelligibility  Not addressed this session  Goal 4: Patient will complete mid to high level word finding tasks with 90% accuracy  Not addressed this session       Goal 5: Patient will retell narratives with 80% accuracy  Not addressed  this session  Goal 6: Improve attention for structured tasks to 90% accuracy  Not addressed this session  Other:Reviewed testing and plan of care with patient  Patient is in agreement with POC at this time       Recommendations:Continue with Plan of Care

## 2022-03-24 ENCOUNTER — TELEPHONE (OUTPATIENT)
Dept: PAIN MEDICINE | Facility: CLINIC | Age: 77
End: 2022-03-24

## 2022-03-24 ENCOUNTER — APPOINTMENT (OUTPATIENT)
Dept: SPEECH THERAPY | Facility: CLINIC | Age: 77
End: 2022-03-24
Payer: MEDICARE

## 2022-03-24 NOTE — TELEPHONE ENCOUNTER
L/m for patient informing her that Dr Armani King reviewed her MRI and is not able to help her, he referred her to Dr Yelena Llanos who is better suited to address her issues  I left her Dr Diana Hollingsworth phone number to call [facility] to schedule an appt  Please do intake   TY

## 2022-03-31 ENCOUNTER — APPOINTMENT (OUTPATIENT)
Dept: SPEECH THERAPY | Facility: CLINIC | Age: 77
End: 2022-03-31
Payer: MEDICARE